# Patient Record
Sex: FEMALE | Race: WHITE
[De-identification: names, ages, dates, MRNs, and addresses within clinical notes are randomized per-mention and may not be internally consistent; named-entity substitution may affect disease eponyms.]

---

## 2017-02-16 ENCOUNTER — HOSPITAL ENCOUNTER (EMERGENCY)
Dept: HOSPITAL 53 - M ED | Age: 32
Discharge: HOME | End: 2017-02-16
Payer: COMMERCIAL
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
## 2017-02-16 DIAGNOSIS — Z87.891: ICD-10-CM
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
## 2017-02-16 DIAGNOSIS — S39.012A: Primary | ICD-10-CM
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
## 2017-02-16 PROCEDURE — 99283 EMERGENCY DEPT VISIT LOW MDM: CPT

## 2017-02-16 PROCEDURE — 96372 THER/PROPH/DIAG INJ SC/IM: CPT

## 2017-02-16 NOTE — EDDOCDS
Nurse's Notes                                                                                     

[facility]                                                                         

Name: Donna aYdav                                                                                

Age: 31 yrs                                                                                       

Sex: Female                                                                                       

: 1985                                                                                   

MRN: [id number]                                                                                     

Arrival Date: 2017                                                                          

Time: 14:31                                                                                       

Account#: [id number]                                                                              

Bed I6                                                                                        

Private MD:                                                                                       

Diagnosis: Strain of muscle, fascia and tendon of lower back                                      

                                                                                                  

Presentation:                                                                                     

                                                                                             

14:39 Presenting complaint: Patient states: injured lower back twisting last Thursday. no     rs3 

      relief with ice/heat application/motrin/tylenol. increased pain today. Acute                

      neurological deficits are not present. Mechanism of Injury: No Mechanism of Injury.         

      Adult Sepsis Screening: The patient does not have new or worsening altered mentation.       

      Patient's respiratory rate is less than 22. Systolic blood pressure is greater than         

      100. Patient has a qSOFA score of 0- Negative Sepsis Screen. Suicide/Homicide risk          

      assessment- the patient denies having any suicidal and/or homicidal ideations and does      

      not present with any other emotional, behavioral or mental health complaints.       

      Status: Patient is not a  or  dependent. Transition of care:          

      patient was not received from another setting of care.                                      

14:39 Acuity: ALFONZO Level 4                                                                     rs3 

14:39 Method Of Arrival: Walkin/Carried/Asstd                                                 rs3 

                                                                                                  

Triage Assessment:                                                                                

14:41 General: Appears in no apparent distress. Pain: Location: back. Pt Declines HIV         rs3 

      testing. Musculoskeletal: Reports Pain is 6 out of 10 on a pain scale.                      

                                                                                                  

OB/GYN:                                                                                           

14:41 LMP 2017                                                                           rs3 

                                                                                                  

Historical:                                                                                       

- Allergies: no known allergies;                                                                  

- Home Meds:                                                                                      

1. none                                                                                         

- PMHx: none;                                                                                     

- PSHx: none;                                                                                     

- Social history: Smoking status: Patient states former smoker of tobacco. No barriers            

to communication noted, The patient speaks fluent English.                                      

- Family history: Not pertinent.                                                                  

- : The pt / caregiver states he / she is not on anticoagulants. Home medication list             

is obtained from the patient.                                                                   

- Exposure Risk Screening:: None identified.                                                      

                                                                                                  

                                                                                                  

Screening:                                                                                        

15:36 Screening information is obtained from the patient. Fall risk: No risks identified.     kr3 

      Assistance ADL's: requires no assistance with activities of daily living. Abuse/DV          

      Screen: The patient / caregiver reports he/she is: not in a situation that causes fear,     

      pain or injury. Nutritional screening: No deficits noted. Advance Directives:               

      Currently, there is no health care proxy. home support is adequate.                         

                                                                                                  

Assessment:                                                                                       

15:35 General: Appears distressed, uncomfortable, Behavior is cooperative, crying. Pain:      kr3 

      Location: lumbar area Pain currently is 10 out of 10 on a pain scale. Quality of pain       

      is described as spasms Is intermittent. Neurological: Level of Consciousness is awake,      

      alert, Gait is steady. Respiratory: Respiratory effort is even, unlabored. Derm: Skin       

      is normal.                                                                                  

16:09 Reassessment: Patient appears in no apparent distress at this time. reports spasms are  kr3 

      intermittent but not as severe when they happen. Respiratory: Respiratory effort is         

      even, unlabored. Derm: Skin is normal.                                                      

                                                                                                  

Vital Signs:                                                                                      

14:34  / 92 RA Sitting (auto/lg); Pulse 88; Resp 16; Temp 98.3(O); Pulse Ox 100% on     bnb 

      R/A; Weight 138.35 kg; Height 5 ft. 4 in. (162.56 cm); Pain 7/10;                           

16:08  / 100; Pulse 76; Resp 16; Temp 97.6; Pulse Ox 100% on R/A; Pain 4/10;            kr3 

14:34 Body Mass Index 52.35 (138.35 kg, 162.56 cm)                                            [facility] 

                                                                                                  

Vitals:                                                                                           

14:34 Log In Time: 2017 at 14:30.                                                b 

                                                                                                  

ED Course:                                                                                        

14:33 Patient visited by Cary Crowley PCA.                                               bnb 

14:33 Patient moved to Waiting                                                                bnb 

14:36 Patient moved to Pre RCE                                                                bnb 

14:41 Triage Initiated                                                                        rs3 

15[facility]19 [facility]                                                        ar2 

15:19 Sharath Oshea MD is Attending Physician.                                               ar2 

15:19 Patient moved to Triage 3                                                               jb5 

15:19 Patient moved to Pre RCE                                                                jb5 

15:20 Patient moved to I6 /                                                                 jb5 

15:21 Patient visited by Jimmy Davies PA-C.                                             ar2 

15:36 The patient / caregiver is instructed regarding the plan of care and ED course.         kr3 

      Accompanied by Family Member, Patient has correct armband on for positive                   

      identification. Bed in low position. Call light in reach. Side rails up X 1.                

15:36 No procedures done that require assistance.                                             kr3 

16:02 [facility] is Referral Physician.                               ar2 

16:09 No IV's were initiated during this patient's visit.                                     kr3 

                                                                                                  

Administered Medications:                                                                         

15:34 Drug: Diazepam 5 mg [diazepam 5 mg/mL injection syringe (1 mL)] Route: IM; Site: right  kr3 

      gluteus;                                                                                    

15:35 Drug: ketorolac 60 mg [ketorolac 30 mg/mL (1 mL) injection solution (2 mL)] Route: IM;  kr3 

      Site: left gluteus;                                                                         

                                                                                                  

                                                                                                  

Order Results:                                                                                    

There are currently no results for this order.                                                    

Outcome:                                                                                          

16:02 Discharge ordered by Provider.                                                          ar2 

16:08 Discharge Assessment: patient administered narcotics - yes. Pt provided with safe       kr3 

      discharge. The following High Risk Discharge criteria are identified: None. Discharged      

      to home ambulatory, with family. Condition: stable. Discharge instructions given to         

      patient, Instructed on discharge instructions, follow up and referral plans. medication     

      usage, no driving heavy equipment, no drinking with medication, Demonstrated                

      understanding of instructions, medications, Pt was receptive of discharge instructions/     

      teaching. Prescriptions given X 2. No special radiology studies were completed.             

      Property sent home with patient.                                                            

16:10 Patient left the ED.                                                                    kr3 

                                                                                                  

Signatures:                                                                                       

Eugenia Dc,RN                       RN   kr3                                                  

Albina Rodriguez, PCA                       PCA  jb5                                                  

Jimmy Davies PA-C                 PAZACHARY ar2                                                  

Xuan Garcia RN                   RN   rs3                                                  

Cary Crowley, PCA                   PCA  bnb                                                  

                                                                                                  

**************************************************************************************************

MTDD

## 2017-02-16 NOTE — EDDOCDS
Physician Documentation                                                                           

[facility]                                                                         

Name: Donna Yadav                                                                                

Age: 31 yrs                                                                                       

Sex: Female                                                                                       

: 1985                                                                                   

MRN: [id number]                                                                                     

Arrival Date: 2017                                                                          

Time: 14:31                                                                                       

Account#: [id number]                                                                              

Bed I6 / 28                                                                                       

Private MD:                                                                                       

Disposition:                                                                                      

17 16:02 Discharged to Home/Self Care. Impression: Strain of muscle, fascia and tendon      

of lower back.                                                                                  

- Condition is Stable.                                                                            

- Discharge Instructions: Back Pain, Adult, Muscle Strain.                                        

- Prescriptions for Ibuprofen 800 mg Oral Tablet - take 1 tablet by ORAL route every 8            

hours As needed take with food; 30 tablet. Cyclobenzaprine 10 mg Oral Tablet - take 1           

tablet by ORAL route 3 times per day As needed; 15 tablet.                                      

- Medication Reconciliation, Local Pharmacy Hours form.                                           

- [facility] ; When: Call to arrange an appointment;            

Reason: Recheck today's complaints, To establish care. Follow up: Emergency                     

Department; When: As needed; Reason: Worsening of conditions.                                   

- Problem is new.                                                                                 

- Symptoms have improved.                                                                         

                                                                                                  

                                                                                                  

                                                                                                  

Historical:                                                                                       

- Allergies: no known allergies;                                                                  

- Home Meds:                                                                                      

1. none                                                                                         

- PMHx: none;                                                                                     

- PSHx: none;                                                                                     

- Social history: Smoking status: Patient states former smoker of tobacco. No barriers            

to communication noted, The patient speaks fluent English.                                      

- Family history: Not pertinent.                                                                  

- : The pt / caregiver states he / she is not on anticoagulants. Home medication list             

is obtained from the patient.                                                                   

- Exposure Risk Screening:: None identified.                                                      

                                                                                                  

                                                                                                  

OB/GYN:                                                                                           

                                                                                             

14:41 LMP 2017                                                                           rs3 

                                                                                                  

Vital Signs:                                                                                      

14:34  / 92 RA Sitting (auto/lg); Pulse 88; Resp 16; Temp 98.3(O); Pulse Ox 100% on     bnb 

      R/A; Weight 138.35 kg / 305.01 lbs; Height 5 ft. 4 in. (162.56 cm); Pain 7/10;              

16:08  / 100; Pulse 76; Resp 16; Temp 97.6; Pulse Ox 100% on R/A; Pain 4/10;            kr3 

14:34 Body Mass Index 52.35 (138.35 kg, 162.56 cm)                                            bnb 

                                                                                                  

MDM:                                                                                              

15:26 ketorolac 60 mg IM once ordered.                                                        ar2 

15:26 Diazepam 5 mg IM once ordered.                                                          ar2 

                                                                                                  

Administered Medications:                                                                         

15:34 Drug: Diazepam 5 mg [diazepam 5 mg/mL injection syringe (1 mL)] Route: IM; Site: right  kr3 

      gluteus;                                                                                    

15:35 Drug: ketorolac 60 mg [ketorolac 30 mg/mL (1 mL) injection solution (2 mL)] Route: IM;  kr3 

      Site: left gluteus;                                                                         

                                                                                                  

                                                                                                  

Signatures:                                                                                       

Eugenia Dc RN                       RN   kr3                                                  

Jimmy Davies PA-C PA-C ar2                                                  

Xuan Garcia RN                   RN   rs3                                                  

                                                                                                  

**************************************************************************************************

MTDD

## 2017-02-18 NOTE — EDDOCDS
Physician Documentation                                                                           

[facility]                                                                         

Name: Donna Yadav                                                                                

Age: 31 yrs                                                                                       

Sex: Female                                                                                       

: 1985                                                                                   

MRN: [id number]                                                                                     

Arrival Date: 2017                                                                          

Time: 14:31                                                                                       

Account#: [id number]                                                                              

Bed I6 / 28                                                                                       

Private MD:                                                                                       

Disposition:                                                                                      

17 16:02 Discharged to Home/Self Care. Impression: Strain of muscle, fascia and tendon      

of lower back.                                                                                  

- Condition is Stable.                                                                            

- Discharge Instructions: Back Pain, Adult, Muscle Strain.                                        

- Prescriptions for Ibuprofen 800 mg Oral Tablet - take 1 tablet by ORAL route every 8            

hours As needed take with food; 30 tablet. Cyclobenzaprine 10 mg Oral Tablet - take 1           

tablet by ORAL route 3 times per day As needed; 15 tablet.                                      

- Medication Reconciliation, Local Pharmacy Hours form.                                           

- [facility] ; When: Call to arrange an appointment;            

Reason: Recheck today's complaints, To establish care. Follow up: Emergency                     

Department; When: As needed; Reason: Worsening of conditions.                                   

- Problem is new.                                                                                 

- Symptoms have improved.                                                                         

                                                                                                  

                                                                                                  

                                                                                                  

Historical:                                                                                       

- Allergies: no known allergies;                                                                  

- Home Meds:                                                                                      

1. none                                                                                         

- PMHx: none;                                                                                     

- PSHx: none;                                                                                     

- Social history: Smoking status: Patient states former smoker of tobacco. No barriers            

to communication noted, The patient speaks fluent English.                                      

- Family history: Not pertinent.                                                                  

- : The pt / caregiver states he / she is not on anticoagulants. Home medication list             

is obtained from the patient.                                                                   

- Exposure Risk Screening:: None identified.                                                      

                                                                                                  

                                                                                                  

OB/GYN:                                                                                           

                                                                                             

14:41 LMP 2017                                                                           rs3 

                                                                                                  

Vital Signs:                                                                                      

14:34  / 92 RA Sitting (auto/lg); Pulse 88; Resp 16; Temp 98.3(O); Pulse Ox 100% on     bnb 

      R/A; Weight 138.35 kg / 305.01 lbs; Height 5 ft. 4 in. (162.56 cm); Pain 7/10;              

16:08  / 100; Pulse 76; Resp 16; Temp 97.6; Pulse Ox 100% on R/A; Pain 4/10;            kr3 

14:34 Body Mass Index 52.35 (138.35 kg, 162.56 cm)                                            bnb 

                                                                                                  

MDM:                                                                                              

15:26 ketorolac 60 mg IM once ordered.                                                        ar2 

15:26 Diazepam 5 mg IM once ordered.                                                          ar2 

02/17                                                                                             

12:04 T-Sheet-- Draft Copy was scanned into [employer] and attached to record.                   gb  

                                                                                                  

Administered Medications:                                                                         

                                                                                             

15:34 Drug: Diazepam 5 mg [diazepam 5 mg/mL injection syringe (1 mL)] Route: IM; Site: right  kr3 

      gluteus;                                                                                    

15:35 Drug: ketorolac 60 mg [ketorolac 30 mg/mL (1 mL) injection solution (2 mL)] Route: IM;  kr3 

      Site: left gluteus;                                                                         

                                                                                                  

                                                                                                  

Signatures:                                                                                       

Jenny Diezt, Reg                  Reg  gb                                                   

Eugenia DcRN                       RN   kr3                                                  

Jimmy Davies PA-C                 PAZACHARY ar2                                                  

Xuan GarciaRN                   RN   rs3                                                  

                                                                                                  

The chart was reviewed and I authenticate all verbal orders and agree with the evaluation and 
treatment provided.Attachments:

                                                                                             

12:04 T-Sheet-- Draft Copy                                                                    gb  

                                                                                                  

**************************************************************************************************



*** Chart Complete ***
MTDD

## 2017-02-18 NOTE — EDDOCDS
Nurse's Notes                                                                                     

[facility]                                                                         

Name: Donna Yadav                                                                                

Age: 31 yrs                                                                                       

Sex: Female                                                                                       

: 1985                                                                                   

MRN: [id number]                                                                                     

Arrival Date: 2017                                                                          

Time: 14:31                                                                                       

Account#: [id number]                                                                              

Bed I6                                                                                        

Private MD:                                                                                       

Diagnosis: Strain of muscle, fascia and tendon of lower back                                      

                                                                                                  

Presentation:                                                                                     

                                                                                             

14:39 Presenting complaint: Patient states: injured lower back twisting last Thursday. no     rs3 

      relief with ice/heat application/motrin/tylenol. increased pain today. Acute                

      neurological deficits are not present. Mechanism of Injury: No Mechanism of Injury.         

      Adult Sepsis Screening: The patient does not have new or worsening altered mentation.       

      Patient's respiratory rate is less than 22. Systolic blood pressure is greater than         

      100. Patient has a qSOFA score of 0- Negative Sepsis Screen. Suicide/Homicide risk          

      assessment- the patient denies having any suicidal and/or homicidal ideations and does      

      not present with any other emotional, behavioral or mental health complaints.       

      Status: Patient is not a  or  dependent. Transition of care:          

      patient was not received from another setting of care.                                      

14:39 Acuity: ALFONZO Level 4                                                                     rs3 

14:39 Method Of Arrival: Walkin/Carried/Asstd                                                 rs3 

                                                                                                  

Triage Assessment:                                                                                

14:41 General: Appears in no apparent distress. Pain: Location: back. Pt Declines HIV         rs3 

      testing. Musculoskeletal: Reports Pain is 6 out of 10 on a pain scale.                      

                                                                                                  

OB/GYN:                                                                                           

14:41 LMP 2017                                                                           rs3 

                                                                                                  

Historical:                                                                                       

- Allergies: no known allergies;                                                                  

- Home Meds:                                                                                      

1. none                                                                                         

- PMHx: none;                                                                                     

- PSHx: none;                                                                                     

- Social history: Smoking status: Patient states former smoker of tobacco. No barriers            

to communication noted, The patient speaks fluent English.                                      

- Family history: Not pertinent.                                                                  

- : The pt / caregiver states he / she is not on anticoagulants. Home medication list             

is obtained from the patient.                                                                   

- Exposure Risk Screening:: None identified.                                                      

                                                                                                  

                                                                                                  

Screening:                                                                                        

15:36 Screening information is obtained from the patient. Fall risk: No risks identified.     kr3 

      Assistance ADL's: requires no assistance with activities of daily living. Abuse/DV          

      Screen: The patient / caregiver reports he/she is: not in a situation that causes fear,     

      pain or injury. Nutritional screening: No deficits noted. Advance Directives:               

      Currently, there is no health care proxy. home support is adequate.                         

                                                                                                  

Assessment:                                                                                       

15:35 General: Appears distressed, uncomfortable, Behavior is cooperative, crying. Pain:      kr3 

      Location: lumbar area Pain currently is 10 out of 10 on a pain scale. Quality of pain       

      is described as spasms Is intermittent. Neurological: Level of Consciousness is awake,      

      alert, Gait is steady. Respiratory: Respiratory effort is even, unlabored. Derm: Skin       

      is normal.                                                                                  

16:09 Reassessment: Patient appears in no apparent distress at this time. reports spasms are  kr3 

      intermittent but not as severe when they happen. Respiratory: Respiratory effort is         

      even, unlabored. Derm: Skin is normal.                                                      

                                                                                                  

Vital Signs:                                                                                      

14:34  / 92 RA Sitting (auto/lg); Pulse 88; Resp 16; Temp 98.3(O); Pulse Ox 100% on     bnb 

      R/A; Weight 138.35 kg; Height 5 ft. 4 in. (162.56 cm); Pain 7/10;                           

16:08  / 100; Pulse 76; Resp 16; Temp 97.6; Pulse Ox 100% on R/A; Pain 4/10;            kr3 

14:34 Body Mass Index 52.35 (138.35 kg, 162.56 cm)                                            [facility] 

                                                                                                  

Vitals:                                                                                           

14:34 Log In Time: 2017 at 14:30.                                                b 

                                                                                                  

ED Course:                                                                                        

14:33 Patient visited by Cary Crowley PCA.                                               bnb 

14:33 Patient moved to Waiting                                                                bnb 

14:36 Patient moved to Pre RCE                                                                bnb 

14:41 Triage Initiated                                                                        rs3 

15[facility]19 [facility]                                                        ar[facility] 

15:19 Sharath Oshea MD is Attending Physician.                                               ar2 

15:19 Patient moved to Triage 3                                                               jb5 

15:19 Patient moved to Pre RCE                                                                jb5 

15:20 Patient moved to I6 /                                                                 jb5 

15:21 Patient visited by Jimmy Davies PA-C.                                             ar2 

15:36 The patient / caregiver is instructed regarding the plan of care and ED course.         kr3 

      Accompanied by Family Member, Patient has correct armband on for positive                   

      identification. Bed in low position. Call light in reach. Side rails up X 1.                

15:36 No procedures done that require assistance.                                             kr3 

16:02 [facility] is Referral Physician.                               ar2 

16:09 No IV's were initiated during this patient's visit.                                     kr3 

                                                                                             

12:04 T-Sheet-- Draft Copy was scanned into [employer] and attached to record.                   gb  

                                                                                                  

Administered Medications:                                                                         

                                                                                             

15:34 Drug: Diazepam 5 mg [diazepam 5 mg/mL injection syringe (1 mL)] Route: IM; Site: right  kr3 

      gluteus;                                                                                    

15:35 Drug: ketorolac 60 mg [ketorolac 30 mg/mL (1 mL) injection solution (2 mL)] Route: IM;  kr3 

      Site: left gluteus;                                                                         

                                                                                                  

                                                                                                  

Order Results:                                                                                    

There are currently no results for this order.                                                    

Outcome:                                                                                          

16:02 Discharge ordered by Provider.                                                          ar2 

16:08 Discharge Assessment: patient administered narcotics - yes. Pt provided with safe       kr3 

      discharge. The following High Risk Discharge criteria are identified: None. Discharged      

      to home ambulatory, with family. Condition: stable. Discharge instructions given to         

      patient, Instructed on discharge instructions, follow up and referral plans. medication     

      usage, no driving heavy equipment, no drinking with medication, Demonstrated                

      understanding of instructions, medications, Pt was receptive of discharge instructions/     

      teaching. Prescriptions given X 2. No special radiology studies were completed.             

      Property sent home with patient.                                                            

16:10 Patient left the ED.                                                                    kr3 

                                                                                                  

Signatures:                                                                                       

Jenny Dietz, Reg                  Reg  Eugenia Peralta,RN                       RN   kr3                                                  

Albina Rodriguez, PCA                       PCA  jb5                                                  

Jimmy Davies, ORIANA                 PAZACHARY ar2                                                  

Xuan Garcia RN                   RN   rs3                                                  

Cary Crowley, PCA                   PCA  bnb                                                  

                                                                                                  

**************************************************************************************************



*** Chart Complete ***
MTDD

## 2017-02-18 NOTE — EDDOCDS
Physician Documentation                                                                           

[facility]                                                                         

Name: Donna Yadav                                                                                

Age: 31 yrs                                                                                       

Sex: Female                                                                                       

: 1985                                                                                   

MRN: [id number]                                                                                     

Arrival Date: 2017                                                                          

Time: 14:31                                                                                       

Account#: [id number]                                                                              

Bed I6 / 28                                                                                       

Private MD:                                                                                       

Disposition:                                                                                      

17 16:02 Discharged to Home/Self Care. Impression: Strain of muscle, fascia and tendon      

of lower back.                                                                                  

- Condition is Stable.                                                                            

- Discharge Instructions: Back Pain, Adult, Muscle Strain.                                        

- Prescriptions for Ibuprofen 800 mg Oral Tablet - take 1 tablet by ORAL route every 8            

hours As needed take with food; 30 tablet. Cyclobenzaprine 10 mg Oral Tablet - take 1           

tablet by ORAL route 3 times per day As needed; 15 tablet.                                      

- Medication Reconciliation, Local Pharmacy Hours form.                                           

- [facility]: [facility] ; When: Call to arrange an appointment;            

Reason: Recheck today's complaints, To establish care. Follow up: Emergency                     

Department; When: As needed; Reason: Worsening of conditions.                                   

- Problem is new.                                                                                 

- Symptoms have improved.                                                                         

                                                                                                  

                                                                                                  

                                                                                                  

Historical:                                                                                       

- Allergies: no known allergies;                                                                  

- Home Meds:                                                                                      

1. none                                                                                         

- PMHx: none;                                                                                     

- PSHx: none;                                                                                     

- Social history: Smoking status: Patient states former smoker of tobacco. No barriers            

to communication noted, The patient speaks fluent English.                                      

- Family history: Not pertinent.                                                                  

- : The pt / caregiver states he / she is not on anticoagulants. Home medication list             

is obtained from the patient.                                                                   

- Exposure Risk Screening:: None identified.                                                      

                                                                                                  

                                                                                                  

OB/GYN:                                                                                           

                                                                                             

14:41 LMP 2017                                                                           rs3 

                                                                                                  

Vital Signs:                                                                                      

14:34  / 92 RA Sitting (auto/lg); Pulse 88; Resp 16; Temp 98.3(O); Pulse Ox 100% on     bnb 

      R/A; Weight 138.35 kg / 305.01 lbs; Height 5 ft. 4 in. (162.56 cm); Pain 7/10;              

16:08  / 100; Pulse 76; Resp 16; Temp 97.6; Pulse Ox 100% on R/A; Pain 4/10;            kr3 

14:34 Body Mass Index 52.35 (138.35 kg, 162.56 cm)                                            bnb 

                                                                                                  

MDM:                                                                                              

15:26 ketorolac 60 mg IM once ordered.                                                        ar2 

15:26 Diazepam 5 mg IM once ordered.                                                          ar2 

02/17                                                                                             

12:04 T-Sheet-- Draft Copy was scanned into [employer] and attached to record.                   gb  

                                                                                                  

Administered Medications:                                                                         

                                                                                             

15:34 Drug: Diazepam 5 mg [diazepam 5 mg/mL injection syringe (1 mL)] Route: IM; Site: right  kr3 

      gluteus;                                                                                    

15:35 Drug: ketorolac 60 mg [ketorolac 30 mg/mL (1 mL) injection solution (2 mL)] Route: IM;  kr3 

      Site: left gluteus;                                                                         

                                                                                                  

                                                                                                  

Signatures:                                                                                       

Jenny Dietz, Reg                  Reg  gb                                                   

Eugenia DcRN                       RN   kr3                                                  

Jimmy Davies PA-C                 PAZACHARY ar2                                                  

Xuan GarciaRN                   RN   rs3                                                  

                                                                                                  

The chart was reviewed and I authenticate all verbal orders and agree with the evaluation and 
treatment provided.Attachments:

                                                                                             

12:04 T-Sheet-- Draft Copy                                                                    gb  

                                                                                                  

**************************************************************************************************



*** Chart Complete ***
MTDD

## 2018-11-02 ENCOUNTER — HOSPITAL ENCOUNTER (OUTPATIENT)
Dept: HOSPITAL 53 - M LAB | Age: 33
End: 2018-11-02
Attending: NURSE PRACTITIONER
Payer: COMMERCIAL

## 2018-11-02 DIAGNOSIS — R94.6: Primary | ICD-10-CM

## 2018-11-02 LAB
FREE T4: 0.99 NG/DL (ref 0.76–1.46)
THYROID STIMULATING HORMONE: 6.01 UIU/ML (ref 0.36–3.74)
THYROPEROXIDASE AB SERPL IA-ACNC: < 28 U/ML (ref ?–60)

## 2018-11-02 PROCEDURE — 84443 ASSAY THYROID STIM HORMONE: CPT

## 2022-03-20 ENCOUNTER — HOSPITAL ENCOUNTER (EMERGENCY)
Dept: HOSPITAL 53 - M ED | Age: 37
Discharge: HOME | End: 2022-03-20
Payer: COMMERCIAL

## 2022-03-20 VITALS — SYSTOLIC BLOOD PRESSURE: 160 MMHG | DIASTOLIC BLOOD PRESSURE: 90 MMHG

## 2022-03-20 VITALS — WEIGHT: 291.61 LBS | HEIGHT: 62 IN | BODY MASS INDEX: 53.66 KG/M2

## 2022-03-20 DIAGNOSIS — L03.113: Primary | ICD-10-CM

## 2022-03-20 DIAGNOSIS — N61.1: ICD-10-CM

## 2022-05-11 ENCOUNTER — HOSPITAL ENCOUNTER (OUTPATIENT)
Dept: HOSPITAL 53 - M LAB | Age: 37
End: 2022-05-11
Attending: INTERNAL MEDICINE
Payer: COMMERCIAL

## 2022-05-11 DIAGNOSIS — Z79.899: ICD-10-CM

## 2022-05-11 DIAGNOSIS — E78.5: Primary | ICD-10-CM

## 2022-05-11 DIAGNOSIS — Z13.220: ICD-10-CM

## 2022-05-11 DIAGNOSIS — E66.9: ICD-10-CM

## 2022-05-11 LAB
ALBUMIN SERPL BCG-MCNC: 3 GM/DL (ref 3.2–5.2)
ALT SERPL W P-5'-P-CCNC: 26 U/L (ref 12–78)
BILIRUB SERPL-MCNC: 0.3 MG/DL (ref 0.2–1)
BUN SERPL-MCNC: 12 MG/DL (ref 7–18)
CALCIUM SERPL-MCNC: 9 MG/DL (ref 8.5–10.1)
CHLORIDE SERPL-SCNC: 107 MEQ/L (ref 98–107)
CHOLEST SERPL-MCNC: 155 MG/DL (ref ?–200)
CHOLEST/HDLC SERPL: 3.04 {RATIO} (ref ?–5)
CO2 SERPL-SCNC: 25 MEQ/L (ref 21–32)
CREAT SERPL-MCNC: 0.73 MG/DL (ref 0.55–1.3)
GFR SERPL CREATININE-BSD FRML MDRD: > 60 ML/MIN/{1.73_M2} (ref 60–?)
GLUCOSE SERPL-MCNC: 110 MG/DL (ref 70–100)
HDLC SERPL-MCNC: 51 MG/DL (ref 40–?)
LDLC SERPL CALC-MCNC: 74 MG/DL (ref ?–100)
NONHDLC SERPL-MCNC: 104 MG/DL
POTASSIUM SERPL-SCNC: 4.3 MEQ/L (ref 3.5–5.1)
PROT SERPL-MCNC: 6.5 GM/DL (ref 6.4–8.2)
SODIUM SERPL-SCNC: 138 MEQ/L (ref 136–145)
TRIGL SERPL-MCNC: 152 MG/DL (ref ?–150)
TSH SERPL DL<=0.005 MIU/L-ACNC: 5.89 UIU/ML (ref 0.36–3.74)